# Patient Record
Sex: MALE | Race: BLACK OR AFRICAN AMERICAN
[De-identification: names, ages, dates, MRNs, and addresses within clinical notes are randomized per-mention and may not be internally consistent; named-entity substitution may affect disease eponyms.]

---

## 2018-07-02 ENCOUNTER — HOSPITAL ENCOUNTER (INPATIENT)
Dept: HOSPITAL 36 - GERO | Age: 73
LOS: 15 days | Discharge: NURSING, REHAB OR LONG-TERM CARE | DRG: 885 | End: 2018-07-17
Attending: PSYCHIATRY & NEUROLOGY | Admitting: PSYCHIATRY & NEUROLOGY
Payer: MEDICARE

## 2018-07-02 VITALS — DIASTOLIC BLOOD PRESSURE: 75 MMHG | SYSTOLIC BLOOD PRESSURE: 130 MMHG

## 2018-07-02 DIAGNOSIS — I10: ICD-10-CM

## 2018-07-02 DIAGNOSIS — Z88.8: ICD-10-CM

## 2018-07-02 DIAGNOSIS — R41.0: ICD-10-CM

## 2018-07-02 DIAGNOSIS — F03.90: ICD-10-CM

## 2018-07-02 DIAGNOSIS — F31.2: Primary | ICD-10-CM

## 2018-07-02 PROCEDURE — G0410 GRP PSYCH PARTIAL HOSP 45-50: HCPCS

## 2018-07-02 PROCEDURE — Z7610: HCPCS

## 2018-07-05 NOTE — PSYCHOSOCIAL EVALUATION
DATE OF SERVICE:  07/02/2018



PSYCHIATRIC INITIAL EVALUATION AND MENTAL STATUS EXAM



PATIENT'S AGE:  73-year-old.



SEX:  Male.



PHYSICIAN:  Dr. Esparza.



CHIEF COMPLAINT:  Agitation and irritable mood.



HISTORY OF PRESENT ILLNESS:  The patient is a 73-year-old _____ who was

transferred from [facility] because of aggressive behavior and

threatening officers and staff.  The patient has been aggressive towards staff

in the facility where he lives and also in the Emergency Room was threatening

staff and threated officers and was not able to follow any directions.  The

patient also has been resisting care.  The patient also in the Emergency Room

was uncooperative and refuses to sign papers or to cooperate with staff or to

give them any information.  The patient has history of bipolar disorder and has

been easily agitated and in irritable and angry mood.  The patient was placed on

a 5150 hold in the Emergency Room for grave disability since he has not been

able to give any information and has not been able to provide any safe plan for

self-care.



PAST PSYCHIATRIC HISTORY:  The patient has history of what seems to be bipolar

disorder.



PAST MEDICAL HISTORY:  The patient has history of hypertension.



SOCIAL HISTORY:  The patient lives in a nursing home.  The patient denies any

alcohol or street drug use.



ALLERGIES:  LITHIUM.



MENTAL STATUS EXAMINATION:  The patient appears older than stated age. 

Irritable mood.  Agitated.  Anxious.  Irritable and angry mood.  Uncooperative. 

Thought processes are with poverty of speech.  The patient did not answer

questions regarding hallucinations or delusions or regarding suicide or homicide

but he is agitated and aggressive.  The patient denies any intention to harm

himself or others, but he is aggressive and threatening others.  The patient is

alert and unable to assess _____ memory or orientation at this time because the

patient is uncooperative.  Poor insight and poor judgment.



ASSESSMENT:  PRIMARY DIAGNOSIS:  Bipolar disorder, manic episode, with psychotic

features.



TREATMENT PLAN:  Continue to monitor his behavior and his condition closely. 

Also, we will start the patient on Seroquel who will adjust the dose.



ESTIMATED LENGTH OF STAY:  5-7 days.



THE PATIENT'S STRENGTHS AND WEAKNESSES:  The patient's strength is not clear at

this time.  Weaknesses are his poor judgment and his anger and threatening the

staff and uncooperative.



AFTER DISCHARGE PLAN:  The patient will return to his placement and outpatient

treatment and followup will continue as an outpatient.



CRITERIA FOR DISCHARGE:  The patient will not be agitated or psychotic and have

better impulse control and we will stabilize psychotropic medications and

establish outpatient treatment plans.





DD: 07/04/2018 19:14

DT: 07/05/2018 01:19

[facility]# [id number]  [id number]

## 2018-07-05 NOTE — HISTORY & PHYSICAL
ADMIT DATE:  07/03/2018



ADMITTING PHYSICIAN:  Dr. Esparza.



REASON FOR ADMISSION:  Psychiatric disorder.



HISTORY OF PRESENT ILLNESS:  This 73-year-old male with history of hypertension

admitted to [facility] for underlying psychiatric illness by Dr. Esparza. Dr. Esparza requested a medical H and P on this patient. During my

evaluation, the patient seems very agitated and very confused, unable to

communicate well.  Most of the history obtained through available medical

record.



PAST MEDICAL HISTORY:  Hypertension per record.



PAST SURGICAL HISTORY:  None reported. 



SOCIAL HISTORY:  Lives at nursing facility. Negative for alcohol, tobacco, or

street drug use.



CURRENT MEDICATIONS:  Per medical reconciliation reviewed.



ALLERGIES:  Allergic to LITHIUM.



REVIEW OF SYSTEMS:  Difficult to obtain due to the patient's underlying

confusions. 



PHYSICAL EXAMINATION:

VITAL SIGNS:  Temperature 97.8, pulse 71, respiration 19, blood pressure 157/71.

 

HEENT:  Unremarkable.

HEART:  S1, S2 normal.

LUNGS:  Clear to auscultation.

ABDOMEN:  Soft, nontender, no guarding or rigidity.

NEUROLOGIC:  The patient is awake, but confused, grossly nonfocal.

EXTREMITIES:  Mild edema noted.



LABORATORY DATA:  No lab data is available. 



ASSESSMENT:

1.  Hypertension.

2.  Confusion.

3.  Psych disorder.

4.  Dementia.



PLAN:  The patient will be continued on Coreg and Norvasc.  Monitor blood

pressures.  Continue Aricept.  Psych management per psychiatrist.  Fall

precaution, aspiration precaution will be given. The patient is medically stable

[facility].



Thank you Dr. Esparza for allowing us to participate in the care of this patient.





DD: 07/05/2018 11:13

DT: 07/05/2018 15:30

[facility]# [id number]  [id number]

## 2018-07-05 NOTE — PROGRESS NOTES
DATE:  07/05/2018



SUBJECTIVE:  Chart reviewed and the patient interviewed.  Also discussed the

patient's condition with the staff and reviewed the records and labs.  The

patient continued to be extremely delusional and angry and is still extremely

suspicious and paranoid.  The patient also is still easily agitated.  The

patient also is disheveled and personal hygiene is poor.  He also is angry and

he needs lots of redirections, was difficult to redirect him.  On the other

hand, the patient started to take his medications.  The patient has still poor

hygiene and he is still unable to cooperate with the staff in regard to his

hygiene.



ASSESSMENT:  The patient is still psychotic and is still agitated.



TREATMENT PLAN:  Continue to monitor his behavior and his condition closely. 

Also, continue to work on his irritable mood and poor hygiene.  Also, continue

to adjust psychotropic medications and follow up closely.





DD: 07/05/2018 06:49

DT: 07/05/2018 10:03

JOB# [id number]  [id number]

## 2018-07-05 NOTE — PROGRESS NOTES
DATE:  



SUBJECTIVE:  Chart reviewed and the patient interviewed.  Also discussed the

patient's condition with the staff and reviewed records and labs.  The patient

is still confused and he is still in angry and in irritable mood.  The patient

also is still having disorganized thoughts and he is unable to carry on any

coherent conversation.  The patient also is still restless and he is still

easily agitated.  Also, still have disorganized thoughts and unable to carry on

any coherent conversation.  The patient also has foul odor and refused to take

shower and is in angry mood.  Also, difficulty following directions.



ASSESSMENT:  The patient is confused and is agitated.



TREATMENT PLAN:  Continue to monitor his behavior and his condition closely. 

Also, we will start the patient on Seroquel 25 mg twice a day and we will adjust

the dose and follow up with discharge plans and with behavioral modifications.





DD: 07/04/2018 19:39

DT: 07/05/2018 02:06

JOB# [id number]  [id number]

## 2018-07-07 NOTE — PROGRESS NOTES
DATE:  07/06/2018



DATE OF SERVICE:  07/06/2018.



SUBJECTIVE:  Chart reviewed and the patient interviewed.  Also discussed the

patient's condition with the staff and reviewed records and labs.  The patient

slept slightly better yesterday since he did take his medications.  The patient

also did take a shower.  He is still wearing his dirty clothes, still foul odor

and poor hygiene.  The patient also is still easily agitated and easily

irritable.  Also, focused on smoking.  The patient also had difficulty following

the staff directions and he still gets agitated and resisting care when staff

tried to redirect him.  Otherwise, the patient started to comply with taking his

medications and seems to be slightly easier to redirect him.



ASSESSMENT:  The patient seems to be slightly less agitated and less irritable.



TREATMENT PLAN:  Continue to monitor his behavior and his condition closely. 

Also, continue to work on his poor impulse control and his agitation and his

irritability and continue to follow up closely.





DD: 07/07/2018 06:16

DT: 07/07/2018 22:42

JOB# [id number]  [id number]

## 2018-07-08 NOTE — PROGRESS NOTES
DATE:  07/07/2018



SUBJECTIVE:  A 73-year-old male currently in the hospital, aggressive behaviors,

threatening officers and staff, aggressive towards staff.  Dr. Esparza seeing the

patient over the past few days, noted that he is delusional, angry, suspicious,

paranoid, very poor personal hygiene.  Staff noting he remains impulsive,

unpredictable, slept fairly well.  Poorly oriented at times.  Noted to be

anxious, depressed.  Medications were noted including dosages and frequencies. 

He is refusing ADLs, refusing to shower for example unclear as to why.



ASSESSMENT:  The patient remains symptomatic, impulsive, unpredictable, not safe

for a lower level of care, ongoing psychotic symptoms.



PLAN:  We will continue to monitor, adjust and _____.





DD: 07/07/2018 07:13

DT: 07/08/2018 00:36

JOB# [id number]  [id number]

## 2018-07-08 NOTE — PROGRESS NOTES
DATE:  07/08/2018



SUBJECTIVE:  I met with the patient in the hospital, aggressive behaviors, had

been threatening officers, threatening staff.  The patient noted to be angry,

suspicious.  On face-to-face, the patient, angry, does not want to talk to me. 

The patient seems to be fully oriented, anxious, internally preoccupied, highly

impulsive, unpredictable, noted to be mostly withdrawn.  Staff noting he remains

suspicious, depressed, does not really know why he is here.  Does not know what

is going on for example, does not know the year, the month.  Medications were

noted including doses and frequencies.



ASSESSMENT:  The patient remains symptomatic, at times focused on smoking,

confused, disoriented, impulsive, unpredictable, noted history of dementia,

currently on Depakote, Seroquel.  We will continue to monitor.  Given his

ongoing symptoms, he remains an acute safety risk.





DD: 07/08/2018 06:51

DT: 07/08/2018 17:23

JOB# [id number]  [id number]

## 2018-07-09 NOTE — PROGRESS NOTES
DATE:  07/09/2018



Covering for Dr. Esparza.



Case was discussed with staff of the patient, reviewed records.  This is a

73-year-old male who was admitted on 07/02/2018 because of irritability and

agitation from [facility] because of aggressive behavior,

threatening officers and staff, aggressive towards staff, unable to participate

in meaningful conversation or make safe plan for his self-care.  He was put on a

hold for gait disability, unable to give any information with a history, which

seem to be like bipolar disorder.  The patient continues to be unable to give

information.  Continues to be confused.  He is on Aricept 10 mg at bedtime,

Seroquel 50 mg twice a day, and ____ 100 mg daily.  Continues to be

unpredictable, impulsive, and needing redirection.  Continues to have poor

insight.  Continues to be at risk for discharge because of his behavior and we

will continue with the patient in group therapy, milieu therapy, and adjust the

medications as needed.





DD: 07/09/2018 12:48

DT: 07/09/2018 21:51

[facility]# [id number]  [id number]

## 2018-07-09 NOTE — GENERAL PROGRESS NOTE
Subjective





- Review of Systems


Service Date: 18


Subjective: 





Patient seen and examined nursing staff reported that patient has been refusing 

his BP meds





Objective





- Physical Exam


Vitals and I&O: 


 Vital Signs











Temp  97.8 F   18 23:50


 


Pulse  76   18 17:10


 


Resp  19   18 23:50


 


BP  126/78   18 17:10


 


Pulse Ox  97   18 23:50








 Intake & Output











 18





 18:59 06:59 18:59


 


Intake Total 1200 300 


 


Balance 1200 300 


 


Intake:   


 


  Oral 1200 300 


 


Other:   


 


  # Voids 3 1 


 


  # Bowel Movements 1 0 











Active Medications: 


Current Medications





Amlodipine Besylate (Norvasc)  10 mg PO DAILY [facility]


   Stop: 18 08:59


   Last Admin: 18 08:09 Dose:  Not Given


Carvedilol (Coreg)  25 mg PO BID [facility]


   Stop: 18 08:59


   Last Admin: 18 08:09 Dose:  Not Given


Divalproex Sodium (Depakote Dr)  125 mg PO BID [facility]; Protocol


   Stop: 18 08:59


   Last Admin: 18 10:05 Dose:  125 mg


Docusate Sodium (Colace)  100 mg PO BID [facility]


   Stop: 18 08:59


   Last Admin: 18 10:05 Dose:  100 mg


Donepezil HCl (Aricept)  10 mg PO HS [facility]


   Stop: 18 20:59


   Last Admin: 18 21:23 Dose:  10 mg


Lorazepam (Ativan)  0.5 mg PO Q4HR PRN; Protocol


   PRN Reason: Anxiety


   Stop: 18 01:47


   Last Admin: 18 09:51 Dose:  0.5 mg


Quetiapine Fumarate (Seroquel)  50 mg PO BID [facility]; Protocol


   Stop: 18 12:44


   Last Admin: 18 10:04 Dose:  50 mg


Thiamine HCl (Vitamin B1)  100 mg PO DAILY [facility]


   Stop: 18 08:59


   Last Admin: 18 10:05 Dose:  100 mg


Trazodone HCl (Desyrel)  50 mg PO HS PRN; Protocol


   PRN Reason: Insomnia


   Stop: 18 03:58


Triamterene/HCTZ (Dyazide)  1 cap PO DAILY [facility]


   Stop: 18 08:59


   Last Admin: 18 10:04 Dose:  1 cap


Zolpidem Tartrate (Ambien)  5 mg PO HS PRN


   PRN Reason: Insomnia


   Stop: 18 01:47


   Last Admin: 18 21:23 Dose:  5 mg








Cardiovascular: Regular rate


Lungs: Clear to auscultation





Assessment/Plan





- Assessment


Assessment: 





HTN


Non compliance


Mental health disorder





- Plan


Plan: 





Monitor BP


Medication compliance advised


Psych management per psychiatrist





Nutritional Asmnt/Malnutr-PDOC





- Dietary Evaluation


Malnutrition Findings (Please click <Entered> for more info): 








Nutritional Asmnt/Malnutrition                             Start:  18 17:

21


Text:                                                      Status: Complete    

  


Freq:                                                                          

  


Protocol:                                                                      

  


 Document     18 17:21  LCJUSTING  (Rec: 18 17:24  LCJUSTING  SULEIMAN-FNS1)


 Nutritional Asmnt/Malnutrition


     Patient General Information


      Nutritional Screening                      Moderate Risk


      Diagnosis                                  psychosis


      Pertinent Medical Hx/Surgical Hx           HTN


      Subjective Information                     Per EMR, PO intake 100% on


                                                 regular diet.


      Current Diet Order/ Nutrition Support      regular


      Pertinent Medications                      colace, seroquel, vit B1


      Pertinent Labs                             no labs


     Nutritional Hx/Data


      Height                                     1.73 m


      Height (Calculated Centimeters)            172.7


      Current Weight (lbs)                       72.575 kg


      Weight (Calculated Kilograms)              72.6


      Weight (Calculated Grams)                  01735.8


      Ideal Body Weight                          154


      Body Mass Index (BMI)                      24.3


      Weight Status                              Approriate


     GI Symptoms


      GI Symptoms                                None


      Last BM                                    none


      Difficult in:                              None


      Skin Integrity/Comment:                    juan 16


      Current %PO                                Good (%)


     Estimated Nutritional Goals


      BEE in Kcals:                              Using Current wt


      Calories/Kcals/Kg                          25-30


      Kcals Calculated                           1652-0475


      Protein:                                   Using Current wt


      Protein g/k


      Protein Calculated                         73


      Fluid: ml                                  1825-2190ml (1ml/kcal)


     Nutritional Problem


      No current Nutrition Prob


       Problem                                   N/A


     Malnutrition Alert


      Is there a minimum of two criteria         No


       selected?                                 


       Query Text:Check all the applicable       


       criteria. A minimum of two criteria are   


       recommended for diagnosis of either       


       severe or non-severe malnutrition.        


     Malnutrition Related to Morbid Obesity


      Malnutrition related to morbid obesity     No


     Intervention/Recommendation


      Comments                                   1. Continue with regular diet


                                                 as ordered.


                                                 2. Monitor PO intake, wt, labs


                                                 and skin integrity


                                                 3. F/U as low risk in 7 days,


                                                 


     Expected Outcomes/Goals


      Expected Outcomes/Goals                    1. PO intake to meet at least


                                                 75% of nutritional needs.


                                                 2. Wt stability, skin to


                                                 remain intact, labs to


                                                 approach WNL.

## 2018-07-10 NOTE — PROGRESS NOTES
DATE:  07/10/2018



Covering for Dr. Esparza.



Case was discussed with staff of the patient, reviewed records.  The patient

continues to be irritable, continues to have poor insight, continues to be

unable to make safe plan for self-care, confused, unpredictable, impulsive,

needing redirection.  Continues to be not ready to go to a lesser level of care

because of his agitative behavior, confusion.  No side effects to the

medication.  No sedation, no nausea, no extrapyramidal symptoms.  We will

continue the patient in group therapy, milieu therapy, and adjust the

medications as needed.





DD: 07/10/2018 12:02

DT: 07/10/2018 22:27

JOB# [id number]  [id number]

## 2018-07-12 NOTE — PROGRESS NOTES
DATE:  07/11/2018



SUBJECTIVE:  Chart reviewed and the patient interviewed.  Also discussed the

patient's condition with the staff and reviewed records and labs.  The patient

continued to be actively hallucinating and actively psychotic.  He is going

around, talking to himself.  The patient also is still restless and he is still

poor self-care.  Also, he is at times uncooperative and refuses to get his vital

signs taken earlier.  Also, he seems to be in a depressed mood and also

confused.  Otherwise, the patient is compliant with taking his medications with

no side effects of medications.



ASSESSMENT:  The patient is still psychotic and agitated.



TREATMENT PLAN:  Continue to monitor his behavior and his condition closely. 

Also, continue adjusting psychotropic medication and follow up closely.





DD: 07/11/2018 20:28

DT: 07/12/2018 17:30

[facility]# [id number]  [id number]

## 2018-07-13 NOTE — PROGRESS NOTES
DATE:  07/12/2018



SUBJECTIVE:  Chart reviewed and the patient interviewed.  Also, discussed the

patient's condition with the staff and reviewed records and labs.  The patient

is still paranoid and is still anxious.  The patient also is suspicious.  He

also is easily agitated and in irritable and angry mood.  Otherwise, the patient

is compliant with taking his medications with no side effects of medications.



ASSESSMENT:  The patient is still psychotic.



TREATMENT PLAN:  Continue monitoring his behavior and his condition closely. 

Also, continue adjusting psychotropic medications and followup.





DD: 07/12/2018 19:02

DT: 07/13/2018 01:24

[facility]# [id number]  [id number]

## 2018-07-14 NOTE — PROGRESS NOTES
DATE:  



SUBJECTIVE:  Chart reviewed and the patient interviewed.  Also discussed the

patient's condition with the staff and reviewed records and labs.  The patient

still seems to be preoccupied and still paranoid.  The patient also is still

easily agitated.  Also, is still restless and is still at times needs lots of

redirections.  The patient on the other hand is compliant with taking

medications with no side effects of medications.



ASSESSMENT:  The patient is still paranoid and seems to be depressed.



TREATMENT PLAN:  Continue to monitor his behavior and his condition closely. 

Also, continue adjusting psychotropic medications and followup.





DD: 07/13/2018 21:15

DT: 07/14/2018 11:27

JOB# [id number]  [id number]

## 2018-07-14 NOTE — PROGRESS NOTES
DATE:  07/14/2018



PSYCHIATRIC PROGRESS NOTE



Chart reviewed and the patient interviewed.  Also discussed the patient's

condition with the staff and reviewed records and labs.  The patient is still

mumbling and is still easily agitated.  The patient also seems to be

preoccupied.  Also, is wandering around the unit and suspicious and paranoid and

aggressive to others.  Also, his thought processes are disorganized.  He also

needs lots of redirections.  Otherwise, the patient is compliant with taking his

medications with no side effects of medications.



ASSESSMENT:  The patient is still agitated and psychotic.



TREATMENT PLAN:  Continue to monitor his behavior and his condition closely. 

Also, continue adjusting psychotropic medications and work on behavioral

modification.





DD: 07/14/2018 08:53

DT: 07/14/2018 20:05

JOB# [id number]  [id number]

## 2018-07-16 NOTE — GENERAL PROGRESS NOTE
Subjective





- Review of Systems


Service Date: 18


Subjective: 





Patient seen and examined doing better per nursing staff patient does take his 

meds now





Objective





- Physical Exam


Vitals and I&O: 


 Vital Signs











Temp  0 F   18 06:52


 


Pulse  72   18 09:54


 


Resp  18   07/15/18 16:43


 


BP  147/82   18 09:54


 


Pulse Ox  99   07/15/18 16:43








 Intake & Output











 07/15/18 07/16/18 07/16/18





 18:59 06:59 18:59


 


Intake Total 1200 180 


 


Balance 1200 180 


 


Intake:   


 


  Oral 1200 180 


 


Other:   


 


  # Voids 3 2 


 


  # Bowel Movements  0 











Active Medications: 


Current Medications





Amlodipine Besylate (Norvasc)  10 mg PO DAILY [facility]


   Stop: 18 08:59


   Last Admin: 18 08:54 Dose:  10 mg


Carvedilol (Coreg)  25 mg PO BID [facility]


   Stop: 18 08:59


   Last Admin: 18 08:55 Dose:  25 mg


Divalproex Sodium (Depakote Dr)  125 mg PO BID [facility]; Protocol


   Stop: 18 08:59


   Last Admin: 18 08:54 Dose:  125 mg


Docusate Sodium (Colace)  100 mg PO BID [facility]


   Stop: 18 08:59


   Last Admin: 18 08:55 Dose:  100 mg


Donepezil HCl (Aricept)  10 mg PO HS [facility]


   Stop: 18 20:59


   Last Admin: 07/15/18 21:02 Dose:  10 mg


Lorazepam (Ativan)  0.5 mg PO Q4HR PRN; Protocol


   PRN Reason: Anxiety


   Stop: 18 01:47


   Last Admin: 18 09:51 Dose:  0.5 mg


Quetiapine Fumarate (Seroquel)  50 mg PO BID [facility]; Protocol


   Stop: 18 12:44


   Last Admin: 18 08:55 Dose:  50 mg


Thiamine HCl (Vitamin B1)  100 mg PO DAILY [facility]


   Stop: 18 08:59


   Last Admin: 18 08:55 Dose:  100 mg


Trazodone HCl (Desyrel)  50 mg PO HS PRN; Protocol


   PRN Reason: Insomnia


   Stop: 18 03:58


Triamterene/HCTZ (Dyazide)  1 cap PO DAILY [facility]


   Stop: 18 08:59


   Last Admin: 18 08:55 Dose:  1 cap


Zolpidem Tartrate (Ambien)  5 mg PO HS PRN


   PRN Reason: Insomnia


   Stop: 18 01:47


   Last Admin: 18 21:31 Dose:  5 mg








Cardiovascular: Regular rate


Lungs: Clear to auscultation





Assessment/Plan





- Assessment


Assessment: 





HTN


Mental health disorder





- Plan


Plan: 





Monitor BP


Continue current BP meds


Psych management per psychiatrist





Nutritional Asmnt/Malnutr-PDOC





- Dietary Evaluation


Malnutrition Findings (Please click <Entered> for more info): 








Nutritional Asmnt/Malnutrition                             Start:  18 17:

21


Text:                                                      Status: Complete    

  


Freq:                                                                          

  


Protocol:                                                                      

  


 Document     18 17:21  LCHENG  (Rec: 18 17:24  LCHENG  [id number])


 Nutritional Asmnt/Malnutrition


     Patient General Information


      Nutritional Screening                      Moderate Risk


      Diagnosis                                  psychosis


      Pertinent Medical Hx/Surgical Hx           HTN


      Subjective Information                     Per EMR, PO intake 100% on


                                                 regular diet.


      Current Diet Order/ Nutrition Support      regular


      Pertinent Medications                      colace, seroquel, vit B1


      Pertinent Labs                             no labs


     Nutritional Hx/Data


      Height                                     1.73 m


      Height (Calculated Centimeters)            172.7


      Current Weight (lbs)                       72.575 kg


      Weight (Calculated Kilograms)              72.6


      Weight (Calculated Grams)                  09491.8


      Ideal Body Weight                          154


      Body Mass Index (BMI)                      24.3


      Weight Status                              Approriate


     GI Symptoms


      GI Symptoms                                None


      Last BM                                    none


      Difficult in:                              None


      Skin Integrity/Comment:                    juan Abreu


      Current %PO                                Good (%)


     Estimated Nutritional Goals


      BEE in Kcals:                              Using Current wt


      Calories/Kcals/Kg                          25-30


      Kcals Calculated                           9873-5548


      Protein:                                   Using Current wt


      Protein g/k


      Protein Calculated                         73


      Fluid: ml                                  1825-2190ml (1ml/kcal)


     Nutritional Problem


      No current Nutrition Prob


       Problem                                   N/A


     Malnutrition Alert


      Is there a minimum of two criteria         No


       selected?                                 


       Query Text:Check all the applicable       


       criteria. A minimum of two criteria are   


       recommended for diagnosis of either       


       severe or non-severe malnutrition.        


     Malnutrition Related to Morbid Obesity


      Malnutrition related to morbid obesity     No


     Intervention/Recommendation


      Comments                                   1. Continue with regular diet


                                                 as ordered.


                                                 2. Monitor PO intake, wt, labs


                                                 and skin integrity


                                                 3. F/U as low risk in 7 days,


                                                 


     Expected Outcomes/Goals


      Expected Outcomes/Goals                    1. PO intake to meet at least


                                                 75% of nutritional needs.


                                                 2. Wt stability, skin to


                                                 remain intact, labs to


                                                 approach WNL.

## 2018-07-16 NOTE — PROGRESS NOTES
DATE:  



SUBJECTIVE:  Chart reviewed and the patient interviewed.  Also discussed the

patient's condition with the staff and reviewed records and labs.  The patient

is still actively hallucinating and is preoccupied and talking to self.  The

patient also mumbles.  He also is still paranoid.  The patient also is still

restless and disorganized thoughts.  Otherwise, the patient is compliant with

taking medications with no side effects of medications.



ASSESSMENT:  The patient is still agitated and is still psychotic and need close

monitoring.



TREATMENT PLAN:  Continue to monitor his behavior and continue adjusting

psychotropic medication and work on behavioral modification.





DD: 07/15/2018 13:17

DT: 07/16/2018 02:10

JOB# [id number]  [id number]

## 2018-07-16 NOTE — PROGRESS NOTES
DATE:  



SUBJECTIVE:  Chart reviewed and the patient interviewed.  Also discussed the

patient's condition with the staff and reviewed records and labs.  The patient

still wanders around the unit and still feels preoccupied.  Also, is still

suspicious and is still paranoid.  Also, interacting minimally with others. 

Otherwise, the patient is compliant with taking his medications with no side

effects of medications.



ASSESSMENT:  The patient still needs close monitoring.



TREATMENT PLAN:  Continue monitoring his behavior and his condition closely. 

Also, continue working on his impulse control and continue to follow up.





DD: 07/16/2018 21:17

DT: 07/16/2018 23:21

JOB# [id number]  [id number]

## 2018-07-17 NOTE — DISCHARGE SUMMARY
DATE OF DISCHARGE:  07/17/2018



DATE OF ADMISSION:  07/02/2018



DATE OF DISCHARGE:  07/17/2018



AGE:  73 years.



SEX:  Male.



PHYSICIAN:  Dr. Esparza.



FINAL DIAGNOSIS AND PRIMARY DIAGNOSIS:  Bipolar disorder, manic episode, severe,

with psychotic features.



REASON FOR HOSPITALIZATION:  The patient was admitted to the hospital because of

increased agitation and aggressive behavior and the patient was threatening

staff and officers in the nursing home where he was living and so transferred to

the hospital.



HOSPITAL COURSE:  The patient continued to be agitated and aggressive.  The

patient also was confused.  The patient also was restless and pacing up and down

the unit.  The patient also started on Seroquel in a dose of 50 mg twice a day

and also is getting trazodone  at bedtime on a p.r.n. basis.  Also, continued to

take Aricept 10 mg every day.  He continued to have episodes of irritability and

agitation.  Depakote was given in a dose of 125 mg twice a day.  Gradually, the

patient's affect was brighter.  The patient was less irritable and less

agitated.  He also was compliant with taking his medications with no side

effects of medications.  [facility] accepted the patient to go there and

the patient was discharged there.



PHYSICAL EXAMINATION:  Was basically with no major medical problems.



AFTER DISCHARGE PLANS:  The patient discharged from the hospital and went to

[facility] with plans for followup there.



EXPECTED OUTCOME AFTER DISCHARGE:  Fair if the patient continued to comply with

taking his medication.





DD: 07/17/2018 16:40

DT: 07/17/2018 19:43

JOB# [id number]  [id number]